# Patient Record
Sex: MALE | Race: WHITE
[De-identification: names, ages, dates, MRNs, and addresses within clinical notes are randomized per-mention and may not be internally consistent; named-entity substitution may affect disease eponyms.]

---

## 2019-03-12 ENCOUNTER — HOSPITAL ENCOUNTER (OUTPATIENT)
Dept: HOSPITAL 92 - SCSMRI | Age: 64
Discharge: HOME | End: 2019-03-12
Attending: NURSE PRACTITIONER
Payer: MEDICARE

## 2019-03-12 DIAGNOSIS — M51.37: ICD-10-CM

## 2019-03-12 DIAGNOSIS — M43.16: Primary | ICD-10-CM

## 2019-03-12 DIAGNOSIS — Z98.890: ICD-10-CM

## 2019-03-12 DIAGNOSIS — M51.9: ICD-10-CM

## 2019-03-12 DIAGNOSIS — M48.061: ICD-10-CM

## 2019-03-12 DIAGNOSIS — M51.36: ICD-10-CM

## 2019-03-12 DIAGNOSIS — M25.78: ICD-10-CM

## 2019-03-12 LAB — ESTIMATED GFR-MDRD - POC: (no result)

## 2019-03-12 PROCEDURE — A9577 INJ MULTIHANCE: HCPCS

## 2019-03-12 PROCEDURE — 82565 ASSAY OF CREATININE: CPT

## 2019-03-12 PROCEDURE — 72158 MRI LUMBAR SPINE W/O & W/DYE: CPT

## 2019-03-12 NOTE — MRI
MRI LUMBAR SPINE WITH AND WITHOUT CONTRAST:

 

INDICATIONS:

Back pain.  History of lumbar surgery in 2015 and 2018.

 

COMPARISON:

MRI lumbar spine from 09/06/2018.

 

TECHNIQUE:

Multiplanar, multisequence imaging of the lumbar spine obtained.

 

FINDINGS:

New postoperative changes are now noted at the L2-L3 level, when compared to the prior study.  Poster
ior laminectomy change is noted at this site with prominent enhancement in the postoperative bed.

 

The lumbar vertebrae maintain height.  Degenerative changes are again noted with anterior osteophytes
 prominent in the lower thoracic and upper lumbar vertebrae.  Slight wedging of L1 is stable.  Inferi
or endplate defect at L2 is again noted.  Degenerative disk changes throughout with loss of disk spac
e at L3-L4, L4-L5, and L5-S1 appear stable.  Slight anterolisthesis of L4-L5 again noted.

 

At L1-L2, minimal posterior listhesis.  Mild disk bulge.  Facet hypertrophy without significant centr
al canal or foraminal stenosis.

 

At L2-L3, new posterior laminectomy changes.  Slight anterolisthesis.  Mild bilateral foraminal narro
wing due to uncinate hypertrophy and diffuse disk bulge.  Prominent facet hypertrophy.  Mild central 
canal stenosis due to the facet hypertrophy.  Mild bilateral foraminal narrowing.  Postoperative whitten
ges, as noted above, with prominent enhancement in the posterior soft tissues.

 

At L3-L4, mild disk bulge.  Facet hypertrophy.  Mild to moderate central canal stenosis, similar to t
he prior exam.

 

At L4-L5, minimal anterolisthesis.  Broad-based disk bulge.  Facet hypertrophy.  Mild to moderate binta
tral canal stenosis.  Right foraminal stenosis secondary to disk bulge and facet hypertrophy, unchang
ed from prior exam.

 

At L5-S1, posterior laminectomy changes again noted.  No significant central canal stenosis.  Left fo
raminal encroachment secondary to disk osteophyte complex, unchanged from prior exam.

 

IMPRESSION:

New postoperative changes are now seen at L2-L3.  Prominent enhancement in the posterior soft tissues
 of the operative bed.  No evidence of abscess or fluid collection.  Findings at each level are descr
ibed above.

 

POS: [facility]

## 2019-04-08 ENCOUNTER — HOSPITAL ENCOUNTER (OUTPATIENT)
Dept: HOSPITAL 92 - TBSIIMAG | Age: 64
Discharge: HOME | End: 2019-04-08
Attending: NEUROLOGICAL SURGERY
Payer: MEDICARE

## 2019-04-08 DIAGNOSIS — M54.16: Primary | ICD-10-CM

## 2019-04-08 DIAGNOSIS — Z98.890: ICD-10-CM

## 2019-04-08 PROCEDURE — 72131 CT LUMBAR SPINE W/O DYE: CPT

## 2019-04-08 NOTE — CT
FCT Lumbar Spine WO Con



History: [Back pain. Numbness.]



Comparison: MRI March 12, 2019



Findings: The aortic contour is nonaneurysmal. No retroperitoneal adenopathy.



No hydronephrosis.



Laminectomy changes at L5 and L2. No acute fracture, malalignment, or subluxation.



Level by level analysis was done on the March 12, 2019 MRI examination which has greater sensitivity.
 Postoperative changes are similar in the midline soft tissues. No evidence for a large drainable flu
id collection. Paraspinal musculature is normal.



Impression: 

1. No acute fracture or malalignment. No new listhesis.

2. Level by level evaluation was performed at the MRI March 12, 2019. No new or worsening neural fora
patrice or spinal canal narrowing.

3. No drainable postoperative fluid collection.



Reported By: Truong Cervantes 

Electronically Signed:  4/8/2019 10:58 AM

## 2019-12-19 ENCOUNTER — HOSPITAL ENCOUNTER (EMERGENCY)
Dept: HOSPITAL 18 - NAV ERS | Age: 64
Discharge: HOME | End: 2019-12-19
Payer: MEDICARE

## 2019-12-19 DIAGNOSIS — Z79.899: ICD-10-CM

## 2019-12-19 DIAGNOSIS — S00.211A: ICD-10-CM

## 2019-12-19 DIAGNOSIS — B30.9: Primary | ICD-10-CM

## 2019-12-19 DIAGNOSIS — F32.9: ICD-10-CM

## 2019-12-19 DIAGNOSIS — Z79.891: ICD-10-CM

## 2019-12-19 DIAGNOSIS — A08.4: ICD-10-CM
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
## 2019-12-19 DIAGNOSIS — I10: ICD-10-CM

## 2019-12-19 PROCEDURE — 99283 EMERGENCY DEPT VISIT LOW MDM: CPT

## 2020-12-18 ENCOUNTER — HOSPITAL ENCOUNTER (EMERGENCY)
Dept: HOSPITAL 18 - NAV ERS | Age: 65
Discharge: ANOTHER HOSPITAL | End: 2020-12-18
Payer: MEDICARE

## 2020-12-18 DIAGNOSIS — M79.605: Primary | ICD-10-CM

## 2020-12-18 DIAGNOSIS — Z79.899: ICD-10-CM

## 2020-12-18 DIAGNOSIS — I10: ICD-10-CM

## 2020-12-18 PROCEDURE — 99284 EMERGENCY DEPT VISIT MOD MDM: CPT

## 2020-12-18 PROCEDURE — 85379 FIBRIN DEGRADATION QUANT: CPT

## 2022-06-01 ENCOUNTER — HOSPITAL ENCOUNTER (INPATIENT)
Dept: HOSPITAL 97 - ER | Age: 67
LOS: 4 days | Discharge: HOME HEALTH | DRG: 644 | End: 2022-06-05
Attending: HOSPITALIST | Admitting: HOSPITALIST
Payer: COMMERCIAL

## 2022-06-01 VITALS — BODY MASS INDEX: 25 KG/M2

## 2022-06-01 DIAGNOSIS — Z20.822: ICD-10-CM

## 2022-06-01 DIAGNOSIS — E83.42: ICD-10-CM

## 2022-06-01 DIAGNOSIS — E83.51: ICD-10-CM

## 2022-06-01 DIAGNOSIS — E66.9: ICD-10-CM

## 2022-06-01 DIAGNOSIS — G89.29: ICD-10-CM

## 2022-06-01 DIAGNOSIS — E87.2: ICD-10-CM

## 2022-06-01 DIAGNOSIS — E22.2: Primary | ICD-10-CM

## 2022-06-01 DIAGNOSIS — R63.1: ICD-10-CM

## 2022-06-01 DIAGNOSIS — D72.829: ICD-10-CM

## 2022-06-01 DIAGNOSIS — M54.9: ICD-10-CM

## 2022-06-01 DIAGNOSIS — E87.6: ICD-10-CM

## 2022-06-01 DIAGNOSIS — I10: ICD-10-CM

## 2022-06-01 DIAGNOSIS — E83.39: ICD-10-CM

## 2022-06-01 DIAGNOSIS — M62.838: ICD-10-CM

## 2022-06-01 LAB
BUN BLD-MCNC: 10 MG/DL (ref 7–18)
BUN BLD-MCNC: 11 MG/DL (ref 7–18)
BUN BLD-MCNC: 7 MG/DL (ref 7–18)
GLUCOSE SERPLBLD-MCNC: 107 MG/DL (ref 74–106)
GLUCOSE SERPLBLD-MCNC: 134 MG/DL (ref 74–106)
GLUCOSE SERPLBLD-MCNC: 168 MG/DL (ref 74–106)
HCT VFR BLD CALC: 38.9 % (ref 39.6–49)
LYMPHOCYTES # SPEC AUTO: 1 K/UL (ref 0.7–4.9)
MAGNESIUM SERPL-MCNC: 1.7 MG/DL (ref 1.8–2.4)
PMV BLD: 8.2 FL (ref 7.6–11.3)
POTASSIUM SERPL-SCNC: 3.5 MMOL/L (ref 3.5–5.1)
POTASSIUM SERPL-SCNC: 3.5 MMOL/L (ref 3.5–5.1)
POTASSIUM SERPL-SCNC: 3.6 MMOL/L (ref 3.5–5.1)
RBC # BLD: 4.47 M/UL (ref 4.33–5.43)

## 2022-06-01 PROCEDURE — 96375 TX/PRO/DX INJ NEW DRUG ADDON: CPT

## 2022-06-01 PROCEDURE — 83880 ASSAY OF NATRIURETIC PEPTIDE: CPT

## 2022-06-01 PROCEDURE — 82553 CREATINE MB FRACTION: CPT

## 2022-06-01 PROCEDURE — 80069 RENAL FUNCTION PANEL: CPT

## 2022-06-01 PROCEDURE — 87040 BLOOD CULTURE FOR BACTERIA: CPT

## 2022-06-01 PROCEDURE — 84439 ASSAY OF FREE THYROXINE: CPT

## 2022-06-01 PROCEDURE — 84132 ASSAY OF SERUM POTASSIUM: CPT

## 2022-06-01 PROCEDURE — 84443 ASSAY THYROID STIM HORMONE: CPT

## 2022-06-01 PROCEDURE — 85025 COMPLETE CBC W/AUTO DIFF WBC: CPT

## 2022-06-01 PROCEDURE — 82306 VITAMIN D 25 HYDROXY: CPT

## 2022-06-01 PROCEDURE — 96374 THER/PROPH/DIAG INJ IV PUSH: CPT

## 2022-06-01 PROCEDURE — 84300 ASSAY OF URINE SODIUM: CPT

## 2022-06-01 PROCEDURE — 80048 BASIC METABOLIC PNL TOTAL CA: CPT

## 2022-06-01 PROCEDURE — 83970 ASSAY OF PARATHORMONE: CPT

## 2022-06-01 PROCEDURE — 84550 ASSAY OF BLOOD/URIC ACID: CPT

## 2022-06-01 PROCEDURE — 84100 ASSAY OF PHOSPHORUS: CPT

## 2022-06-01 PROCEDURE — 83735 ASSAY OF MAGNESIUM: CPT

## 2022-06-01 PROCEDURE — 82570 ASSAY OF URINE CREATININE: CPT

## 2022-06-01 PROCEDURE — 83935 ASSAY OF URINE OSMOLALITY: CPT

## 2022-06-01 PROCEDURE — 82550 ASSAY OF CK (CPK): CPT

## 2022-06-01 PROCEDURE — 81015 MICROSCOPIC EXAM OF URINE: CPT

## 2022-06-01 PROCEDURE — 97161 PT EVAL LOW COMPLEX 20 MIN: CPT

## 2022-06-01 PROCEDURE — 96361 HYDRATE IV INFUSION ADD-ON: CPT

## 2022-06-01 PROCEDURE — 82652 VIT D 1 25-DIHYDROXY: CPT

## 2022-06-01 PROCEDURE — 97116 GAIT TRAINING THERAPY: CPT

## 2022-06-01 PROCEDURE — 81003 URINALYSIS AUTO W/O SCOPE: CPT

## 2022-06-01 PROCEDURE — 36415 COLL VENOUS BLD VENIPUNCTURE: CPT

## 2022-06-01 PROCEDURE — 99285 EMERGENCY DEPT VISIT HI MDM: CPT

## 2022-06-01 RX ADMIN — ANTACID TABLETS PRN MG: 500 TABLET, CHEWABLE ORAL at 18:48

## 2022-06-01 RX ADMIN — HYDROCODONE BITARTRATE AND ACETAMINOPHEN PRN TAB: 5; 325 TABLET ORAL at 20:46

## 2022-06-01 RX ADMIN — SODIUM CHLORIDE SCH: 0.9 INJECTION, SOLUTION INTRAVENOUS at 16:35

## 2022-06-01 RX ADMIN — Medication SCH ML: at 20:45

## 2022-06-01 RX ADMIN — ENOXAPARIN SODIUM SCH MG: 40 INJECTION SUBCUTANEOUS at 17:37

## 2022-06-01 RX ADMIN — ANTACID TABLETS PRN MG: 500 TABLET, CHEWABLE ORAL at 20:42

## 2022-06-01 RX ADMIN — CYCLOBENZAPRINE HYDROCHLORIDE PRN MG: 10 TABLET, FILM COATED ORAL at 20:45

## 2022-06-01 NOTE — ER
Nurse's Notes                                                                                     

 [facility]                                                                 

Name: Yan Law                                                                             

Age: 67 yrs                                                                                       

Sex: Male                                                                                         

: 1955                                                                                   

MRN: [id number]                                                                                   

Arrival Date: 2022                                                                          

Time: 06:49                                                                                       

Account#: [id number]                                                                            

Bed 19                                                                                            

Private MD:                                                                                       

Diagnosis: Altered mental status, unspecified;Hypo-osmolality and hyponatremia;Opioid use,        

  unspecified with withdrawal                                                                     

                                                                                                  

Presentation:                                                                                     

                                                                                             

06:49 Chief complaint: EMS states: Report patient c/o chronic back pain ,body cramping, and   ag7 

      he is out of medication. Coronavirus screen: Client denies travel out of the [place] in        

      the last 14 days. At this time, the client does not indicate any symptoms associated        

      with coronavirus-19. Ebola Screen: Patient negative for fever greater than or equal to      

      101.5 degrees Fahrenheit, and additional compatible Ebola Virus Disease symptoms            

      Patient denies exposure to infectious person. Patient denies travel to an                   

      Ebola-affected area in the 21 days before illness onset. Initial Sepsis Screen: Does        

      the patient meet any 2 criteria? No. Patient's initial sepsis screen is negative. Does      

      the patient have a suspected source of infection? No. Patient's initial sepsis screen       

      is negative. Risk Assessment: Do you want to hurt yourself or someone else? Patient         

      reports no desire to harm self or others. Onset of symptoms was 2022.               

06:49 Method Of Arrival: EMS: [facility]                                                       7 

06:49 Acuity: EVERARDO 3                                                                           ag7 

                                                                                                  

Triage Assessment:                                                                                

06:55 General: Appears in no apparent distress. Behavior is calm, cooperative, appropriate    ag7 

      for age. Pain: Complains of pain in back Pain does not radiate. Pain currently is 10        

      out of 10 on a pain scale. Quality of pain is described as aching, sharp, Pain began        

      suddenly, Is chronic. Neuro: No deficits noted. Cardiovascular: No deficits noted.          

      Respiratory: No deficits noted.                                                             

                                                                                                  

Historical:                                                                                       

- Allergies:                                                                                      

06:52 No Known Allergies;                                                                     ag7 

- Home Meds:                                                                                      

06:52 Atenolol Oral [Active]; gabapentin oral [Active]; Hydrocodone-Acetaminophen Oral        ag7 

      [Active];                                                                                   

- PMHx:                                                                                           

06:52 Chronic back pain; Hypertensive disorder; Neuropathy;                                   ag7 

- PSHx:                                                                                           

06:52 BACK SURGERY;                                                                           ag7 

                                                                                                  

- Immunization history:: Adult Immunizations up to date, Client reports having NOT                

  received the Covid vaccine. Flu vaccine is not up to date. Patient has never been               

  vaccinated.                                                                                     

- Social history:: Smoking status: Patient denies any tobacco usage or history of.                

  Patient/guardian denies using alcohol, street drugs.                                            

                                                                                                  

                                                                                                  

Screenin:55 Abuse screen: Denies threats or abuse. Nutritional screening: No deficits noted.        ag7 

      Tuberculosis screening: No symptoms or risk factors identified. Fall Risk No fall in        

      past 12 months (0 pts). No secondary diagnosis (0 pts). No IV (0 pts). Ambulatory Aid-      

      None/Bed Rest/Nurse Assist (0 pts). Gait- Normal/Bed Rest/Wheelchair (0 pts) Mental         

      Status- Oriented to own ability (0 pts). Total Sommers Fall Scale indicates No Risk (0-24     

      pts).                                                                                       

                                                                                                  

Assessment:                                                                                       

07:03 Reassessment: pt c/o nauseousness when call light answered. emesis basin provided.      tw2 

      provider notified.                                                                          

07:39 Reassessment: provider at bedside at this time.                                         tw2 

09:02 Reassessment: Patient and/or family updated on plan of care and expected duration. Pain tw2 

      level reassessed. Patient is alert, oriented x 3, equal unlabored respirations, skin        

      warm/dry/pink. pt is still moaning audibly at this time. medicated as per ordered. will     

      continue to monitor. pt states "i just want to sleep, i havent slept in 4 days".            

11:22 Reassessment: Patient appears in no apparent distress at this time. Patient and/or      tw2 

      family updated on plan of care and expected duration. Pain level reassessed.                

                                                                                                  

Vital Signs:                                                                                      

06:49  / 69; Pulse 65; Resp 20 S; Temp 98.2(O); Pulse Ox 99% on R/A; Weight 113.4 kg    ag7 

      (R); Height 5 ft. 11 in. (180.34 cm) (R); Pain 10/10;                                       

09:02  / 71; Pulse 71; Resp 19; Pulse Ox 99% on R/A;                                    tw2 

10:00  / 108; Pulse 69; Resp 17; Pulse Ox 97% on R/A;                                   tw2 

11:21  / 110; Pulse 64; Resp 17; Pulse Ox 95% on R/A;                                   tw2 

12:00  / 50; Pulse 82; Resp 17; Pulse Ox 98% on R/A;                                    tw2 

13:00 BP 90 / 73 Supine; Pulse 82; Resp 17; Pulse Ox 99% on R/A;                              tw2 

14:10  / 73; Pulse 70; Resp 17; Pulse Ox 97% on R/A;                                    tw2 

06:49 Body Mass Index 34.87 (113.40 kg, 180.34 cm)                                            ag7 

                                                                                                  

ED Course:                                                                                        

06:49 Patient arrived in ED.                                                                  ag7 

06:52 Triage completed.                                                                       ag7 

07:03 Bed in low position. Call light in reach. Cardiac monitor on. Pulse ox on.              tw2 

07:10 Cori Garza RN is Primary Nurse.                                                        tw2 

07:31 Monico Barrientos MD is Attending Physician.                                              kdr 

08:47 Inserted saline lock: 20 gauge in right antecubital area, using aseptic technique.      tw2 

      Blood collected.                                                                            

10:14 Hair Carpenter is Hospitalizing Provider.                                               kdr 

14:12 Arm band placed on.                                                                     tw2 

14:26 Awaiting lab results, Awaiting: prior to room assignment for admission.                 tw2 

15:49 No provider procedures requiring assistance completed. Patient admitted, IV remains in  tw2 

      place.                                                                                      

                                                                                                  

Administered Medications:                                                                         

08:45 Drug: NS 0.9% 1000 ml Route: IV; Rate: 1 bolus; Site: right antecubital;                tw2 

11:00 Follow up: Response: No adverse reaction; IV Status: Completed infusion; IV Intake:     tw2 

      1000ml                                                                                      

08:47 Drug: Zofran (Ondansetron) 4 mg Route: IVP; Site: right antecubital;                    tw2 

14:12 Follow up: Response: No adverse reaction; Nausea is decreased                           tw2 

08:57 Drug: Ketorolac 15 mg Route: IVP; Site: right antecubital;                              tw2 

09:30 Follow up: Response: No adverse reaction                                                tw2 

10:03 Not Given (md garcia): NS 0.45 % with KCl 20 mEq/L 1000 ml IV at 125 ml/hr once         ss  

10:11 Drug: NS 0.9% 1000 ml Route: IV; Rate: 125 ml/hr; Site: right antecubital;              tw2 

16:22 Follow up: IV Status: Infusion continued upon admission                                 tw2 

                                                                                                  

                                                                                                  

Intake:                                                                                           

11:00 IV: 1000ml; Total: 1000ml.                                                              tw2 

                                                                                                  

Outcome:                                                                                          

10:16 Decision to Hospitalize by Provider.                                                    kdr 

15:49 Admitted to Med/surg accompanied by tech, via wheelchair, room 405, with chart, Report  tw2 

      called to  ROSY Lau                                                                          

16:22 Condition: stable                                                                       tw2 

16:22 Patient left the ED.                                                                    tw2 

                                                                                                  

Signatures:                                                                                       

Monico Barrientos MD MD   kdr                                                  

Cori Garza RN                          RN   tw2                                                  

Marina Ulloa RN                       RN   ag7                                                  

Serenity Castellano RN   ss                                                   

                                                                                                  

Corrections: (The following items were deleted from the chart)                                    

10:42 09:02 Pulse 71bpm; Resp 19bpm; Pulse Ox 99% RA; tw2                                     tw2 

11:22 09:02  / 108; Pulse 69bpm; Resp 17bpm; Pulse Ox 97% RA; tw2                       tw2 

                                                                                                  

**************************************************************************************************

## 2022-06-01 NOTE — P.HP
Certification for Inpatient


Patient admitted to: Inpatient


With expected LOS: >2 Midnights


Practitioner: I am a practitioner with admitting privileges, knowledge of 

patient current condition, hospital course, and medical plan of care.


Services: Services provided to patient in accordance with Admission requirements

found in Title 42 Section 412.3 of the Code of Federal Regulations





Patient History


Date of Service: 06/01/22


Reason for admission: Backpain


History of Present Illness: 


67-year-old gentleman with a history of chronic back pain and hypertension 

presented to the emergency department with a complaint of back pain which has 

gotten progressively worse over the past 1 week.  He is on a chronic hydrocodone

therapy for the back pain, hydrocodone was started about 1 month ago.  Before 

that his pain was being managed with tramadol.  Patient also complaining of 

spasms in his arms and legs and difficulty moving.  He denied any fever or 

chills.  He denied any nausea vomiting or diarrhea.  He denied any cough.  Blood

work done in the emergency department demonstrated hyponatremia and 

leukocytosis.  UA not done yet, COVID-19 test is pending.  Patient with 

uncontrolled pain.  He reports he ran out of his pain medication about 1 week 

ago.  He just established care with a pain management physician in [place].

Patient is hospitalized for further management.








- Past Medical/Surgical History


-: Chronic back pain


-: Hypertension


-: Obesity


-: Back surgery





- Family History


  ** Father


-: Cancer





- Social History


Smoking Status: Never smoker


Alcohol use: No


CD- Drugs: No


Place of Residence: Home





Review of Systems


Other: 


Except as documented, all other systems reviewed and negative.








Physical Examination





- Physical Exam


General: Alert, Oriented x3, Mild distress (Due to pain)


HEENT: Atraumatic, Normocephalic, PERRLA, Mucous membr. moist/pink, EOMI, 

Sclerae nonicteric


Neck: Supple, JVD not distended


Respiratory: Clear to auscultation bilaterally, Normal air movement


Cardiovascular: Regular rate/rhythm, Normal S1 S2, No murmurs, Edema (1+ 

bilateral lower extremity edema)


Capillary refill: <2 Seconds


Gastrointestinal: Normal bowel sounds, Soft and benign, Non-distended, No 

tenderness


Musculoskeletal: No swelling, No tenderness


Integumentary: No rashes, No cyanosis


Neurological: Normal speech, Normal strength at 5/5 x4 extr, Cranial nerves 3-12

intact


Lymphatics: No axilla or inguinal lymphadenopathy





- Studies


Laboratory Data (last 24 hrs)





06/01/22 08:47: Sodium 123 L, Potassium 3.6, BUN 11, Creatinine 0.69, Glucose 

168 H


06/01/22 08:47: WBC 16.9 H, Hgb 13.9, Hct 38.9 L, Plt Count 230








Assessment and Plan





- Problems (Diagnosis)


(1) Hyponatremia


Current Visit: Yes   Status: Acute   





(2) Leukocytosis


Current Visit: Yes   Status: Acute   





(3) Chronic back pain


Current Visit: Yes   Status: Acute   





(4) Hypertension


Current Visit: Yes   Status: Acute   





- Plan


Admit patient to the medical floor.


The etiology of hyponatremia unknown.


Hyponatremia may be contributing to patient's muscle spasms.


Start aggressive IV normal saline hydration.


Monitor BMP every 6 hours to follow sodium level.


Nephrology consulted to assist with management.


Obtain UA and blood cultures given leukocytosis.  Antibiotics pending results.


Supportive measures with muscle relaxant and Norco.


Monitor CBC to follow leukocytosis.








- Advance Directives


Does patient have a Living Will: No


Does patient have a Durable POA for Healthcare: No

## 2022-06-01 NOTE — EDPHYS
Physician Documentation                                                                           

 [facility]                                                                 

Name: Yan Law                                                                             

Age: 67 yrs                                                                                       

Sex: Male                                                                                         

: 1955                                                                                   

MRN: [id number]                                                                                   

Arrival Date: 2022                                                                          

Time: 06:49                                                                                       

Account#: [id number]                                                                            

Bed 19                                                                                            

Private MD:                                                                                       

ED Physician Monico Barrientos                                                                       

HPI:                                                                                              

                                                                                             

13:35 This 67 yrs old Male presents to ER via EMS with complaints of Back Pain.               kdr 

13:35 Patient presents to the ED complaining of medication shortage for the last 4 days. He   kdr 

      states he has been on Norco 3 times daily for some years. He has since located locally      

      here and has not been able to get a refill of his narcotics. Patient appears slow in        

      mentation and somewhat altered. He also states he has been unable to eat or drink in        

      the last few days. He has not had any recent physician visits. He states that he has an     

      appointment to see Dr. Harris on Monday.. Onset: The symptoms/episode began/occurred      

      gradually, 4 day(s) ago. Severity of symptoms: At their worst the symptoms were mild in     

      the emergency department the symptoms are unchanged. The patient has not experienced        

      similar symptoms in the past. The patient has not recently seen a physician.                

                                                                                                  

Historical:                                                                                       

- Allergies:                                                                                      

06:52 No Known Allergies;                                                                     ag7 

- Home Meds:                                                                                      

06:52 Atenolol Oral [Active]; gabapentin oral [Active]; Hydrocodone-Acetaminophen Oral        ag7 

      [Active];                                                                                   

- PMHx:                                                                                           

06:52 Chronic back pain; Hypertensive disorder; Neuropathy;                                   ag7 

- PSHx:                                                                                           

06:52 BACK SURGERY;                                                                           ag7 

                                                                                                  

- Immunization history:: Adult Immunizations up to date, Client reports having NOT                

  received the Covid vaccine. Flu vaccine is not up to date. Patient has never been               

  vaccinated.                                                                                     

- Social history:: Smoking status: Patient denies any tobacco usage or history of.                

  Patient/guardian denies using alcohol, street drugs.                                            

                                                                                                  

                                                                                                  

ROS:                                                                                              

13:35 Constitutional: Negative for fever, chills, and weight loss, Eyes: Negative for injury, kdr 

      pain, redness, and discharge, ENT: Negative for injury, pain, and discharge, Neck:          

      Negative for injury, pain, and swelling, Cardiovascular: Negative for chest pain,           

      palpitations, and edema, Respiratory: Negative for shortness of breath, cough,              

      wheezing, and pleuritic chest pain, Abdomen/GI: Negative for abdominal pain, nausea,        

      vomiting, diarrhea, and constipation, : Negative for injury, bleeding, discharge, and     

      swelling, MS/Extremity: Negative for injury and deformity, Skin: Negative for injury,       

      rash, and discoloration, Neuro: Negative for headache, weakness, numbness, tingling,        

      and seizure activity. Psych: Negative for depression, anxiety, suicide ideation,            

      homicidal ideation, and hallucinations, Allergy/Immunology: Negative for hives, rash,       

      and allergies, Endocrine: Negative for neck swelling, polydipsia, polyuria, polyphagia,     

      and marked weight changes, Hematologic/Lymphatic: Negative for swollen nodes, abnormal      

      bleeding, and unusual bruising.                                                             

13:35 Back: Positive for pain at rest, pain with movement, Negative for injury or acute           

      deformity, decreased range of motion.                                                       

                                                                                                  

Exam:                                                                                             

13:35 Constitutional:  This is a well developed, well nourished patient who is awake, alert,  kdr 

      and in no acute distress. Head/Face:  Normocephalic, atraumatic. Eyes:  Pupils equal        

      round and reactive to light, extra-ocular motions intact.  Lids and lashes normal.          

      Conjunctiva and sclera are non-icteric and not injected.  Cornea within normal limits.      

      Periorbital areas with no swelling, redness, or edema. Neck:  Trachea midline, no           

      thyromegaly or masses palpated, and no cervical lymphadenopathy.  Supple, full range of     

      motion without nuchal rigidity, or vertebral point tenderness.  No Meningismus.             

      Chest/axilla:  Normal chest wall appearance and motion.  Nontender with no deformity.       

      No lesions are appreciated. Cardiovascular:  Regular rate and rhythm with a normal S1       

      and S2.  No gallops, murmurs, or rubs.  Normal PMI, no JVD.  No pulse deficits.             

      Respiratory:  Lungs have equal breath sounds bilaterally, clear to auscultation and         

      percussion.  No rales, rhonchi or wheezes noted.  No increased work of breathing, no        

      retractions or nasal flaring. Abdomen/GI:  Soft, non-tender, with normal bowel sounds.      

      No distension or tympany.  No guarding or rebound.  No evidence of tenderness               

      throughout. Back:  No spinal tenderness.  No costovertebral tenderness.  Full range of      

      motion. Skin:  Warm, dry with normal turgor.  Normal color with no rashes, no lesions,      

      and no evidence of cellulitis.                                                              

13:35 ENT: Mouth: Oral mucosa: dry.                                                               

                                                                                                  

Vital Signs:                                                                                      

06:49  / 69; Pulse 65; Resp 20 S; Temp 98.2(O); Pulse Ox 99% on R/A; Weight 113.4 kg    ag7 

      (R); Height 5 ft. 11 in. (180.34 cm) (R); Pain 10/10;                                       

09:02  / 71; Pulse 71; Resp 19; Pulse Ox 99% on R/A;                                    tw2 

10:00  / 108; Pulse 69; Resp 17; Pulse Ox 97% on R/A;                                   tw2 

11:21  / 110; Pulse 64; Resp 17; Pulse Ox 95% on R/A;                                   tw2 

12:00  / 50; Pulse 82; Resp 17; Pulse Ox 98% on R/A;                                    tw2 

13:00 BP 90 / 73 Supine; Pulse 82; Resp 17; Pulse Ox 99% on R/A;                              tw2 

14:10  / 73; Pulse 70; Resp 17; Pulse Ox 97% on R/A;                                    tw2 

06:49 Body Mass Index 34.87 (113.40 kg, 180.34 cm)                                            ag7 

                                                                                                  

MDM:                                                                                              

10:16 Patient medically screened.                                                             kdr 

13:35 Data reviewed: vital signs, nurses notes, lab test result(s), radiologic studies.       kdr 

      Counseling: I had a detailed discussion with the patient and/or guardian regarding: the     

      historical points, exam findings, and any diagnostic results supporting the                 

      discharge/admit diagnosis, the presence of at least one elevated blood pressure reading     

      (>120/80) during this emergency department visit, lab results, radiology results, the       

      need for further work-up and treatment in the hospital.                                     

                                                                                                  

                                                                                             

07:47 Order name: CBC with Diff; Complete Time: 09:32                                         kdr 

                                                                                             

07:47 Order name: Chem 7; Complete Time: 10:03                                                kdr 

                                                                                             

11:32 Order name: COVID-19 SARS RT PCR (Document "Date of Onset" if Symptomatic)              bd  

                                                                                             

13:30 Order name: Urinalysis W/Microscopic                                                    EDMS

                                                                                             

13:30 Order name: Blood Culture                                                               EDMS

                                                                                             

08:51 Order name: IV Start; Complete Time: 08:51                                              tw2 

                                                                                                  

Administered Medications:                                                                         

08:45 Drug: NS 0.9% 1000 ml Route: IV; Rate: 1 bolus; Site: right antecubital;                tw2 

11:00 Follow up: Response: No adverse reaction; IV Status: Completed infusion; IV Intake:     tw2 

      1000ml                                                                                      

08:47 Drug: Zofran (Ondansetron) 4 mg Route: IVP; Site: right antecubital;                    tw2 

14:12 Follow up: Response: No adverse reaction; Nausea is decreased                           tw2 

08:57 Drug: Ketorolac 15 mg Route: IVP; Site: right antecubital;                              tw2 

09:30 Follow up: Response: No adverse reaction                                                tw2 

10:03 Not Given (md garcia): NS 0.45 % with KCl 20 mEq/L 1000 ml IV at 125 ml/hr once         ss  

10:11 Drug: NS 0.9% 1000 ml Route: IV; Rate: 125 ml/hr; Site: right antecubital;              tw2 

16:22 Follow up: IV Status: Infusion continued upon admission                                 tw2 

                                                                                                  

                                                                                                  

Disposition Summary:                                                                              

22 10:16                                                                                    

Hospitalization Ordered                                                                           

      Hospitalization Status: Inpatient Admission                                             kdr 

      Provider: Hair Carpenter                                                                kdr 

      Location: [facility] ([facility]patient)                                                 kdr 

      Condition: Fair                                                                         kdr 

      Problem: new                                                                            kdr 

      Symptoms: have improved                                                                 kdr 

      Bed/Room Type: Standard                                                                 kdr 

      Room Assignment: 405(22 15:41)                                                    dw  

      Diagnosis                                                                                   

        - Altered mental status, unspecified                                                  kdr 

        - Hypo-osmolality and hyponatremia                                                    kdr 

        - Opioid use, unspecified with withdrawal                                             kdr 

      Forms:                                                                                      

        - Medication Reconciliation Form                                                      kdr 

        - SBAR form                                                                           kdr 

Signatures:                                                                                       

Dispatcher [employer]                           Corazon Shukla RN                        RN   dw                                                   

Monico Barrientos MD MD   kdr                                                  

Cori Garza RN                          RN   tw2                                                  

Marina Ulloa RN                       RN   ag7                                                  

Serenity Castellano RN   ss                                                   

                                                                                                  

Corrections: (The following items were deleted from the chart)                                    

15:41 10:16 kdr                                                                               dw  

                                                                                                  

**************************************************************************************************

## 2022-06-02 LAB
BUN BLD-MCNC: 6 MG/DL (ref 7–18)
BUN BLD-MCNC: 6 MG/DL (ref 7–18)
BUN BLD-MCNC: 8 MG/DL (ref 7–18)
BUN BLD-MCNC: 8 MG/DL (ref 7–18)
CKMB CREATINE KINASE MB: 10.2 NG/ML (ref 1–3.6)
GLUCOSE SERPLBLD-MCNC: 112 MG/DL (ref 74–106)
GLUCOSE SERPLBLD-MCNC: 114 MG/DL (ref 74–106)
GLUCOSE SERPLBLD-MCNC: 121 MG/DL (ref 74–106)
GLUCOSE SERPLBLD-MCNC: 154 MG/DL (ref 74–106)
HCT VFR BLD CALC: 33.4 % (ref 39.6–49)
LYMPHOCYTES # SPEC AUTO: 1.1 K/UL (ref 0.7–4.9)
MAGNESIUM SERPL-MCNC: 1.5 MG/DL (ref 1.8–2.4)
PMV BLD: 8.3 FL (ref 7.6–11.3)
POTASSIUM SERPL-SCNC: 2.9 MMOL/L (ref 3.5–5.1)
POTASSIUM SERPL-SCNC: 3.2 MMOL/L (ref 3.5–5.1)
POTASSIUM SERPL-SCNC: 3.3 MMOL/L (ref 3.5–5.1)
POTASSIUM SERPL-SCNC: 3.4 MMOL/L (ref 3.5–5.1)
POTASSIUM UR-SCNC: 22 MMOL/L (ref 20–40)
RBC # BLD: 3.95 M/UL (ref 4.33–5.43)
SODIUM UR-SCNC: 36 MMOL/L (ref 27–287)
TSH SERPL DL<=0.05 MIU/L-ACNC: 0.3 UIU/ML (ref 0.36–3.74)
UA DIPSTICK W REFLEX MICRO PNL UR: (no result)

## 2022-06-02 RX ADMIN — HYDROCODONE BITARTRATE AND ACETAMINOPHEN PRN TAB: 5; 325 TABLET ORAL at 20:27

## 2022-06-02 RX ADMIN — MORPHINE SULFATE PRN MG: 2 INJECTION, SOLUTION INTRAMUSCULAR; INTRAVENOUS at 01:51

## 2022-06-02 RX ADMIN — ANTACID TABLETS PRN MG: 500 TABLET, CHEWABLE ORAL at 10:32

## 2022-06-02 RX ADMIN — MORPHINE SULFATE PRN MG: 2 INJECTION, SOLUTION INTRAMUSCULAR; INTRAVENOUS at 15:29

## 2022-06-02 RX ADMIN — SODIUM CHLORIDE SCH MLS: 0.9 INJECTION, SOLUTION INTRAVENOUS at 17:26

## 2022-06-02 RX ADMIN — ANTACID TABLETS PRN MG: 500 TABLET, CHEWABLE ORAL at 22:26

## 2022-06-02 RX ADMIN — ENOXAPARIN SODIUM SCH MG: 40 INJECTION SUBCUTANEOUS at 08:28

## 2022-06-02 RX ADMIN — SODIUM CHLORIDE SCH MLS: 0.9 INJECTION, SOLUTION INTRAVENOUS at 08:28

## 2022-06-02 RX ADMIN — Medication SCH ML: at 22:13

## 2022-06-02 RX ADMIN — ANTACID TABLETS PRN MG: 500 TABLET, CHEWABLE ORAL at 15:27

## 2022-06-02 RX ADMIN — HYDROCODONE BITARTRATE AND ACETAMINOPHEN PRN TAB: 5; 325 TABLET ORAL at 05:02

## 2022-06-02 RX ADMIN — CYCLOBENZAPRINE HYDROCHLORIDE PRN MG: 10 TABLET, FILM COATED ORAL at 20:26

## 2022-06-02 RX ADMIN — ANTACID TABLETS PRN MG: 500 TABLET, CHEWABLE ORAL at 18:04

## 2022-06-02 RX ADMIN — MORPHINE SULFATE PRN MG: 2 INJECTION, SOLUTION INTRAMUSCULAR; INTRAVENOUS at 08:29

## 2022-06-02 RX ADMIN — SODIUM CHLORIDE SCH MLS: 0.9 INJECTION, SOLUTION INTRAVENOUS at 00:44

## 2022-06-02 RX ADMIN — MORPHINE SULFATE PRN MG: 2 INJECTION, SOLUTION INTRAMUSCULAR; INTRAVENOUS at 22:15

## 2022-06-02 RX ADMIN — Medication SCH: at 08:29

## 2022-06-02 NOTE — P.PN
Subjective


Date of Service: 06/02/22


Chief Complaint: Backpain


Patient reports feeling better today.


His muscle spasms have resolved.


He is ambulatory.  He stated his pain is better.


Sodium level has not improved.








Physical Examination





- Vital Signs


Temperature: 98.2 F


Blood Pressure: 148/63


Pulse: 70


Respirations: 18


Pulse Ox (%): 98





- Physical Exam


General: Alert, In no apparent distress, Oriented x3


HEENT: PERRLA, Mucous membr. moist/pink, EOMI, Sclerae nonicteric


Neck: Supple, JVD not distended


Respiratory: Clear to auscultation bilaterally, Normal air movement


Cardiovascular: Regular rate/rhythm, Normal S1 S2, No murmurs, Edema (Bilateral 

lower extremities.)


Capillary refill: <2 Seconds


Gastrointestinal: Normal bowel sounds, Soft and benign, Non-distended, No 

tenderness


Musculoskeletal: No tenderness


Integumentary: No rashes, No erythema, No cyanosis


Neurological: Normal speech, Normal strength at 5/5 x4 extr, Cranial nerves 3-12

intact





Assessment And Plan





- Current Problems (Diagnosis)


(1) Hyponatremia


Current Visit: Yes   Status: Acute   





(2) Leukocytosis


Current Visit: Yes   Status: Acute   





(3) Chronic back pain


Current Visit: Yes   Status: Acute   





(4) Hypertension


Current Visit: Yes   Status: Acute   





- Plan


The etiology of hyponatremia unknown.  I suspect SIADH since sodium level has 

not improved with IV normal saline.


Nephrology input appreciated.


Replete potassium, keep potassium level around 4 given that he experienced 

muscle spasms.


Continue IV normal saline hydration.


Monitor BMP every 6 hours to follow sodium level.


We will also restrict free water.


Nephrology is following and assisting with management.


UA is still uncollected. Blood cultures. Antibiotics pending UA result.


Supportive measures with muscle relaxant and Norco.


Monitor CBC to follow leukocytosis.

## 2022-06-02 NOTE — P.CNS
Date of Consult: 06/02/22


Reason for Consult: Hyponatremia


Requesting Physician: dejon carter


Chief Complaint: Backpain


History of Present Illness: 





67M w/ PMHx of Htn & chronic back pain who p/w acute worsening of his back pain,

found to have hyponatremia.  Serum Na 123 on adm, most recently at 127.  He 

received IV fluids.





Allergies





No Known Allergies Allergy (Unverified 06/01/22 13:40)


   





Home Medications: 








Citalopram [Celexa] 40 mg PO DAILY 06/01/22 


Gabapentin 300 mg PO TID 06/01/22 


Hydrocodone Bit/Acetaminophen [Norco  Tablet] 1 each PO TID PRN 06/01/22 


Tramadol HCl [Ultram] 50 mg PO TID PRN 06/01/22 








- Past Medical/Surgical History


-: Chronic back pain


-: Hypertension


-: Obesity


-: Back surgery





- Family History


  ** Father


Medical History: Cancer





- Social History


Alcohol use: No


CD- Drugs: No


Place of Residence: Home





Review of Systems


General: Weakness


Eyes: Unremarkable


ENT: Unremarkable


Respiratory: Unremarkable


Cardiovascular: Unremarkable


Gastrointestinal: Unremarkable


Genitourinary: Unremarkable


Musculoskeletal: Back Pain


Integumentary: Unremarkable


Neurological: Unremarkable


Lymphatics: Unremarkable





Physical Examination














Temp Pulse Resp BP Pulse Ox


 


 98.2 F   70   18   148/63 H  98 


 


 06/02/22 08:00  06/02/22 08:00  06/02/22 08:00  06/02/22 08:00  06/02/22 08:00








General: In no apparent distress


HEENT: Atraumatic, Normocephalic


Neck: Supple, JVD not distended


Respiratory: Clear to auscultation bilaterally


Cardiovascular: No rubs, No murmurs


Gastrointestinal: Soft and benign, No rebound


Musculoskeletal: No clubbing, No swelling


Integumentary: No warmth


Neurological: Normal speech, Normal tone


Lymphatics: No axilla or inguinal lymphadenopathy


Urinary: Other (No bladder distention)


External genitalia: Deferred


Rectal: Deferred


Conclusions/Impression: 





# Hyponatremia 2/2 high ADH state from back pain + nausea +/- low solute intake


Serum Na 123 on adm, improved to 127


Urine chem c/w high ADH state


BNP elevated.  Cont NS gtt for now.


He is a vegetarian.  Advised on adeq po solid food intake TID.





# Hypokalemia


KCl repletion ongoing





# HypoMg


Mg repletion ongoing





# Chronic back pain


Per primary team





# Htn


BP above goal


Start Amlodipine 5 mg po daily


Pain control

## 2022-06-03 LAB
BUN BLD-MCNC: 5 MG/DL (ref 7–18)
BUN BLD-MCNC: 5 MG/DL (ref 7–18)
GLUCOSE SERPLBLD-MCNC: 116 MG/DL (ref 74–106)
GLUCOSE SERPLBLD-MCNC: 154 MG/DL (ref 74–106)
MAGNESIUM SERPL-MCNC: 1.9 MG/DL (ref 1.8–2.4)
POTASSIUM SERPL-SCNC: 3.6 MMOL/L (ref 3.5–5.1)
POTASSIUM SERPL-SCNC: 3.6 MMOL/L (ref 3.5–5.1)

## 2022-06-03 RX ADMIN — POTASSIUM & SODIUM PHOSPHATES POWDER PACK 280-160-250 MG SCH PKT: 280-160-250 PACK at 09:15

## 2022-06-03 RX ADMIN — TRAMADOL HYDROCHLORIDE PRN MG: 50 TABLET, COATED ORAL at 15:53

## 2022-06-03 RX ADMIN — Medication SCH: at 09:00

## 2022-06-03 RX ADMIN — GABAPENTIN SCH MG: 300 CAPSULE ORAL at 20:18

## 2022-06-03 RX ADMIN — ANTACID TABLETS PRN MG: 500 TABLET, CHEWABLE ORAL at 11:15

## 2022-06-03 RX ADMIN — ANTACID TABLETS PRN MG: 500 TABLET, CHEWABLE ORAL at 14:15

## 2022-06-03 RX ADMIN — MORPHINE SULFATE PRN MG: 2 INJECTION, SOLUTION INTRAMUSCULAR; INTRAVENOUS at 11:15

## 2022-06-03 RX ADMIN — ANTACID TABLETS PRN MG: 500 TABLET, CHEWABLE ORAL at 09:31

## 2022-06-03 RX ADMIN — MORPHINE SULFATE PRN MG: 2 INJECTION, SOLUTION INTRAMUSCULAR; INTRAVENOUS at 04:54

## 2022-06-03 RX ADMIN — SODIUM CHLORIDE SCH MLS: 0.9 INJECTION, SOLUTION INTRAVENOUS at 21:13

## 2022-06-03 RX ADMIN — POTASSIUM & SODIUM PHOSPHATES POWDER PACK 280-160-250 MG SCH PKT: 280-160-250 PACK at 09:31

## 2022-06-03 RX ADMIN — AMLODIPINE BESYLATE SCH: 5 TABLET ORAL at 09:00

## 2022-06-03 RX ADMIN — SODIUM CHLORIDE SCH MLS: 0.9 INJECTION, SOLUTION INTRAVENOUS at 11:15

## 2022-06-03 RX ADMIN — SODIUM CHLORIDE SCH MLS: 0.9 INJECTION, SOLUTION INTRAVENOUS at 01:55

## 2022-06-03 RX ADMIN — TRAMADOL HYDROCHLORIDE PRN MG: 50 TABLET, COATED ORAL at 20:17

## 2022-06-03 RX ADMIN — Medication PRN MG: at 01:55

## 2022-06-03 RX ADMIN — AMLODIPINE BESYLATE SCH MG: 5 TABLET ORAL at 09:15

## 2022-06-03 RX ADMIN — Medication SCH: at 20:18

## 2022-06-03 RX ADMIN — ANTACID TABLETS PRN MG: 500 TABLET, CHEWABLE ORAL at 20:17

## 2022-06-03 RX ADMIN — CYCLOBENZAPRINE HYDROCHLORIDE PRN MG: 10 TABLET, FILM COATED ORAL at 21:12

## 2022-06-03 RX ADMIN — ANTACID TABLETS PRN MG: 500 TABLET, CHEWABLE ORAL at 15:53

## 2022-06-03 RX ADMIN — POTASSIUM & SODIUM PHOSPHATES POWDER PACK 280-160-250 MG SCH PKT: 280-160-250 PACK at 06:04

## 2022-06-03 RX ADMIN — ENOXAPARIN SODIUM SCH MG: 40 INJECTION SUBCUTANEOUS at 09:16

## 2022-06-03 RX ADMIN — HYDROCODONE BITARTRATE AND ACETAMINOPHEN PRN TAB: 5; 325 TABLET ORAL at 01:56

## 2022-06-03 NOTE — P.PN
Subjective


Date of Service: 06/03/22


Chief Complaint: Backpain


Patient states he feels much better.  States his appetite has improved.  He also

reports his pain is better


He stated his muscle spasms have resolved.


Sodium level improving slowly.








Physical Examination





- Vital Signs


Temperature: 97.8 F


Blood Pressure: 158/86


Pulse: 80


Respirations: 16


Pulse Ox (%): 99





- Physical Exam


General: Alert, In no apparent distress, Oriented x3


HEENT: Mucous membr. moist/pink


Respiratory: Clear to auscultation bilaterally, Normal air movement


Cardiovascular: Regular rate/rhythm, Normal S1 S2


Gastrointestinal: Normal bowel sounds, Soft and benign, Non-distended, No 

tenderness


Musculoskeletal: No swelling


Integumentary: No rashes


Neurological: Normal strength at 5/5 x4 extr





Assessment And Plan





- Current Problems (Diagnosis)


(1) Hyponatremia


Current Visit: Yes   Status: Acute   





(2) Leukocytosis


Current Visit: Yes   Status: Acute   





(3) Chronic back pain


Current Visit: Yes   Status: Acute   





(4) Hypertension


Current Visit: Yes   Status: Acute   





- Plan


The etiology of hyponatremia unknown.  I suspect SIADH.


Nephrology input appreciated.


Currently normokalemic


Continue IV normal saline hydration.


Monitor BMP every 6 hours to follow sodium level.


We will also restrict free water.


Nephrology is following


UA: No evidence of UTI. Blood cultures: No growth to date.


Supportive measures with muscle relaxant and pain meds.


Etiology of leukocytosis unknown.  No evidence of infection


Monitor CBC to follow leukocytosis.

## 2022-06-03 NOTE — P.PN
Subjective


Date of Service: 06/03/22


Chief Complaint: Backpain


Subjective: No new changes





Physical Examination





- Vital Signs


Temperature: 98.2 F


Blood Pressure: 164/79


Pulse: 77


Respirations: 14


Pulse Ox (%): 98





- Physical Exam


General: In no apparent distress


HEENT: Atraumatic, Normocephalic


Neck: Supple, JVD not distended


Respiratory: Clear to auscultation bilaterally


Cardiovascular: No rubs, No murmurs


Gastrointestinal: Non-distended, No guarding


Musculoskeletal: No clubbing


Integumentary: No warmth


Neurological: Normal speech, Normal tone


Lymphatics: No axilla or inguinal lymphadenopathy


Urinary: Other (no bladder distention)


External genitalia: Deferred


Rectal: Deferred





Assessment And Plan





- Plan





# Hyponatremia 2/2 high ADH state from back pain + nausea +/- low solute intake


Serum Na 123 on adm, improved to 129


Urine chem c/w high ADH state


BNP elevated.  Cont NS gtt for now.


He is a vegetarian.  Advised on adeq po solid food intake TID.





# Hypokalemia


Improved


Monitor/replete prn





# HypoMg


Improved


Monitor/replete prn





# HypoPO4


Phos repletion po today





# Chronic back pain


Per primary team





# Htn


Cont current BP med regimen


Pain control

## 2022-06-04 LAB
BUN BLD-MCNC: 4 MG/DL (ref 7–18)
GLUCOSE SERPLBLD-MCNC: 82 MG/DL (ref 74–106)
POTASSIUM SERPL-SCNC: 2.4 MMOL/L (ref 3.5–5.1)
POTASSIUM UR-SCNC: 17 MMOL/L (ref 20–40)
SODIUM UR-SCNC: 114 MMOL/L (ref 27–287)

## 2022-06-04 RX ADMIN — GABAPENTIN SCH MG: 300 CAPSULE ORAL at 08:21

## 2022-06-04 RX ADMIN — GABAPENTIN SCH MG: 300 CAPSULE ORAL at 20:09

## 2022-06-04 RX ADMIN — POTASSIUM CHLORIDE SCH MLS: 200 INJECTION, SOLUTION INTRAVENOUS at 06:30

## 2022-06-04 RX ADMIN — ACETAMINOPHEN AND CODEINE PHOSPHATE PRN TAB: 300; 30 TABLET ORAL at 21:07

## 2022-06-04 RX ADMIN — POTASSIUM & SODIUM PHOSPHATES POWDER PACK 280-160-250 MG SCH PKT: 280-160-250 PACK at 09:26

## 2022-06-04 RX ADMIN — ANTACID TABLETS PRN MG: 500 TABLET, CHEWABLE ORAL at 04:44

## 2022-06-04 RX ADMIN — POTASSIUM CHLORIDE SCH MLS: 200 INJECTION, SOLUTION INTRAVENOUS at 09:28

## 2022-06-04 RX ADMIN — TRAMADOL HYDROCHLORIDE PRN MG: 50 TABLET, COATED ORAL at 04:36

## 2022-06-04 RX ADMIN — SODIUM CHLORIDE SCH MLS: 0.9 INJECTION, SOLUTION INTRAVENOUS at 13:10

## 2022-06-04 RX ADMIN — TRAMADOL HYDROCHLORIDE PRN MG: 50 TABLET, COATED ORAL at 00:16

## 2022-06-04 RX ADMIN — TRAMADOL HYDROCHLORIDE PRN MG: 50 TABLET, COATED ORAL at 08:22

## 2022-06-04 RX ADMIN — Medication SCH ML: at 20:09

## 2022-06-04 RX ADMIN — AMLODIPINE BESYLATE SCH MG: 5 TABLET ORAL at 08:22

## 2022-06-04 RX ADMIN — ANTACID TABLETS PRN MG: 500 TABLET, CHEWABLE ORAL at 08:21

## 2022-06-04 RX ADMIN — Medication PRN MG: at 00:16

## 2022-06-04 RX ADMIN — POTASSIUM & SODIUM PHOSPHATES POWDER PACK 280-160-250 MG SCH PKT: 280-160-250 PACK at 08:21

## 2022-06-04 RX ADMIN — POTASSIUM & SODIUM PHOSPHATES POWDER PACK 280-160-250 MG SCH PKT: 280-160-250 PACK at 06:30

## 2022-06-04 RX ADMIN — TRAMADOL HYDROCHLORIDE SCH MG: 50 TABLET, COATED ORAL at 20:09

## 2022-06-04 RX ADMIN — POTASSIUM CHLORIDE SCH MLS: 200 INJECTION, SOLUTION INTRAVENOUS at 11:51

## 2022-06-04 RX ADMIN — TRAMADOL HYDROCHLORIDE SCH MG: 50 TABLET, COATED ORAL at 15:07

## 2022-06-04 RX ADMIN — ANTACID TABLETS PRN MG: 500 TABLET, CHEWABLE ORAL at 12:27

## 2022-06-04 RX ADMIN — ANTACID TABLETS PRN MG: 500 TABLET, CHEWABLE ORAL at 20:09

## 2022-06-04 RX ADMIN — SODIUM CHLORIDE SCH: 0.9 INJECTION, SOLUTION INTRAVENOUS at 00:35

## 2022-06-04 RX ADMIN — MAGNESIUM OXIDE TAB 400 MG (241.3 MG ELEMENTAL MG) SCH MG: 400 (241.3 MG) TAB at 20:08

## 2022-06-04 RX ADMIN — Medication SCH: at 08:22

## 2022-06-04 RX ADMIN — SODIUM CHLORIDE SCH MLS: 0.9 INJECTION, SOLUTION INTRAVENOUS at 04:36

## 2022-06-04 RX ADMIN — TRAMADOL HYDROCHLORIDE PRN MG: 50 TABLET, COATED ORAL at 12:24

## 2022-06-04 RX ADMIN — ENOXAPARIN SODIUM SCH MG: 40 INJECTION SUBCUTANEOUS at 08:20

## 2022-06-04 RX ADMIN — ANTACID TABLETS PRN MG: 500 TABLET, CHEWABLE ORAL at 16:21

## 2022-06-04 RX ADMIN — GABAPENTIN SCH MG: 300 CAPSULE ORAL at 13:08

## 2022-06-04 NOTE — P.PN
Subjective


Date of Service: 06/04/22


Chief Complaint: Backpain


Patient of uncontrolled generalized bodily pain


Hyponatremia resolved but patient has had a multiple electrolyte abnormalities 

including hypokalemia, hypocalcemia and metabolic acidosis.


He was abusing Tums for GERD, about 8 to 10 tablets/day.





Physical Examination





- Vital Signs


Temperature: 98.5 F


Blood Pressure: 157/82


Pulse: 77


Respirations: 20


Pulse Ox (%): 98





- Physical Exam


General: Alert, In no apparent distress, Oriented x3


HEENT: Mucous membr. moist/pink


Neck: JVD not distended


Respiratory: Clear to auscultation bilaterally, Normal air movement


Cardiovascular: No edema, Regular rate/rhythm, Normal S1 S2


Gastrointestinal: Soft and benign, Non-distended, No tenderness


Musculoskeletal: No swelling


Neurological: Normal speech, Normal strength at 5/5 x4 extr, Cranial nerves 3-12

intact





Assessment And Plan





- Current Problems (Diagnosis)


(1) Hyponatremia


Current Visit: Yes   Status: Acute   





(2) Leukocytosis


Current Visit: Yes   Status: Acute   





(3) Chronic back pain


Current Visit: Yes   Status: Acute   





(4) Hypertension


Current Visit: Yes   Status: Acute   





(5) Hypocalcemia


Current Visit: Yes   Status: Acute   





(6) Metabolic acidosis


Current Visit: Yes   Status: Acute   





- Plan


The etiology of hyponatremia unknown.  Probably SIADH.


Nephrology input appreciated.  Patient with multiple electrolyte abnormalities 

today.


Stopped IV fluid.  Sodium level increased by 10 mEq within 18 hours.


Continue to monitor BMP.


Replete calcium and potassium.


Check magnesium level.


Nephrology is following


UA: No evidence of UTI. Blood cultures: No growth to date.


Supportive measures with muscle relaxant and pain meds.  Patient prefers 

tramadol and Tylenol 3.


Etiology of leukocytosis unknown.  No evidence of infection


Check CBC in am.


Patient with reduced functional capacity due to pain.


Continue PT.

## 2022-06-04 NOTE — P.PN
Subjective


Date of Service: 06/04/22


Chief Complaint: Backpain





Subjective 


Pt admitted with hyponatremia , low phosphorus and Mg 


sodium improved slowly on IVF, but pt developed  hypoklemia 








Today 


Na improved to 138 


labs today also K 2.4, Phos 2.1 


will stop IVF 


will add ensure 


pt denied diarrhea 


will check uric acid 


will satrtr on daily MG Oxide 





physical exam 


General: Awake, NAD, obese  


HEENT: Atraumatic, Normocephalic


Neck: Supple, no elevated JVD 


Respiratory: Other CTAB. No rales or wheezes 


Cardiovascular: No rubs, No murmurs


Gastrointestinal: Soft and benign, Non-distended


Musculoskeletal: No clubbing


Integumentary: No warmth


Neurological: Normal tone, Sensation intact


Lymphatics: No axilla or inguinal lymphadenopathy


Urinary: Other (no bladder distention)





A/P 


# Hyponatremia 2/2 high ADH state from back pain + nausea +/- low solute intake


Na improved today to 138 


will stop IVF 


will check uric acid 


TSH 0.3 








# Hypokalemia , hypomagnesemia and hypophospahtemia 


possibly due to hypomagensemia 


pt with obesity , so unlikely due to poor intake 


TSH 0.3 


pt not on PPI 


will start daily Mg replacement 


will check Uric acid 








# Chronic back pain


Per primary team





# Htn


Cont current BP med regimen


Pain control








Total time spent 65 minutes including documentation, reviewing labs , placing 

orders and discussing plan of care with medical staff and pt








Physical Examination





- Vital Signs


Temperature: 98.5 F


Blood Pressure: 157/82


Pulse: 77


Respirations: 20


Pulse Ox (%): 98

## 2022-06-05 VITALS — TEMPERATURE: 98.1 F | SYSTOLIC BLOOD PRESSURE: 155 MMHG | DIASTOLIC BLOOD PRESSURE: 75 MMHG

## 2022-06-05 VITALS — OXYGEN SATURATION: 98 %

## 2022-06-05 LAB
ALBUMIN SERPL BCP-MCNC: 2.8 G/DL (ref 3.4–5)
BUN BLD-MCNC: 6 MG/DL (ref 7–18)
GLUCOSE SERPLBLD-MCNC: 134 MG/DL (ref 74–106)
HCT VFR BLD CALC: 32.3 % (ref 39.6–49)
LYMPHOCYTES # SPEC AUTO: 1.5 K/UL (ref 0.7–4.9)
MAGNESIUM SERPL-MCNC: 1.6 MG/DL (ref 1.8–2.4)
PMV BLD: 8 FL (ref 7.6–11.3)
POTASSIUM SERPL-SCNC: 3.9 MMOL/L (ref 3.5–5.1)
RBC # BLD: 3.69 M/UL (ref 4.33–5.43)

## 2022-06-05 RX ADMIN — ENOXAPARIN SODIUM SCH MG: 40 INJECTION SUBCUTANEOUS at 08:33

## 2022-06-05 RX ADMIN — GABAPENTIN SCH MG: 300 CAPSULE ORAL at 08:33

## 2022-06-05 RX ADMIN — ANTACID TABLETS PRN MG: 500 TABLET, CHEWABLE ORAL at 05:50

## 2022-06-05 RX ADMIN — TRAMADOL HYDROCHLORIDE SCH: 50 TABLET, COATED ORAL at 03:00

## 2022-06-05 RX ADMIN — Medication SCH ML: at 08:33

## 2022-06-05 RX ADMIN — ACETAMINOPHEN AND CODEINE PHOSPHATE PRN TAB: 300; 30 TABLET ORAL at 03:25

## 2022-06-05 RX ADMIN — MAGNESIUM OXIDE TAB 400 MG (241.3 MG ELEMENTAL MG) SCH MG: 400 (241.3 MG) TAB at 08:33

## 2022-06-05 RX ADMIN — GABAPENTIN SCH MG: 300 CAPSULE ORAL at 13:22

## 2022-06-05 RX ADMIN — TRAMADOL HYDROCHLORIDE SCH MG: 50 TABLET, COATED ORAL at 01:15

## 2022-06-05 RX ADMIN — AMLODIPINE BESYLATE SCH MG: 5 TABLET ORAL at 08:36

## 2022-06-05 RX ADMIN — ANTACID TABLETS PRN MG: 500 TABLET, CHEWABLE ORAL at 10:05

## 2022-06-05 RX ADMIN — ANTACID TABLETS PRN MG: 500 TABLET, CHEWABLE ORAL at 01:19

## 2022-06-05 RX ADMIN — TRAMADOL HYDROCHLORIDE SCH MG: 50 TABLET, COATED ORAL at 05:50

## 2022-06-05 RX ADMIN — TRAMADOL HYDROCHLORIDE SCH MG: 50 TABLET, COATED ORAL at 10:04

## 2022-06-05 NOTE — P.DS
Admission Date: 06/01/22


Discharge Date: 06/05/22


Disposition: [facility]/HOME HEALTH CARE


Discharge Condition: FAIR


Reason for Admission: Backpain





- Problems


(1) Hyponatremia


Current Visit: Yes   Status: Acute   





(2) Leukocytosis


Current Visit: Yes   Status: Acute   





(3) Chronic back pain


Current Visit: Yes   Status: Acute   





(4) Hypertension


Current Visit: Yes   Status: Acute   





(5) Hypocalcemia


Current Visit: Yes   Status: Acute   





(6) Metabolic acidosis


Current Visit: Yes   Status: Acute   


Brief History of Present Illness: 


67-year-old gentleman with a history of chronic back pain and hypertension 

presented to the emergency department with a complaint of back pain which has 

gotten progressively worse over the past 1 week.  He is on a chronic hydrocodone

therapy for the back pain, hydrocodone was started about 1 month ago.  Before 

that his pain was being managed with tramadol.  Patient also complaining of 

spasms in his arms and legs and difficulty with mobility.  He denied any fever 

or chills.  He denied any nausea vomiting or diarrhea.  He denied any cough.  

Blood work done in the emergency department demonstrated hyponatremia and 

leukocytosis.  UA negative for UTI, COVID-19 test negative. Patient with 

uncontrolled pain.  He reports he ran out of his pain medication about 1 week 

ago.  He just established care with a pain management physician in [place].

Patient was hospitalized for further management.





Hospital Course: 


Patient with psychogenic polydipsia.  Hyponatremia likely secondary to 

polydipsia.


Patient started on IV normal saline and nephrology consulted.


Serum sodium level improved but patient developed other electrolyte 

abnormalities including hypokalemia, metabolic acidosis and hypocalcemia.  

Nephrology attributes to multiple electrolyte imbalance to hypomagnesemia versus

IV normal saline.  Electrolyte abnormalities were successfully repleted.  

Patient noted to have psychogenic dyspnea, states that he drinks about 7-8 

pitchers of water in a day.


IV fluid discontinued.  Patient placed on a fluid restriction.  Nephrology also 

prescribed sodium tablets.


UA: No evidence of UTI. Blood cultures: No growth to date.


I suspect his muscle spasms were due to electrolyte abnormalities.  Muscle 

spasms were managed with Flexeril.  He was also on tramadol for pain control.  

Patient stated he prefers tramadol to Drury for outpatient pain control.  He has

an appointment to establish care with a pain management physician tomorrow.


Etiology of leukocytosis unknown.  No evidence of infection.  Leukocytosis 

resolved.


Patient at baseline has decreased functional capacity.  He was seen and 

evaluated by PT who recommended home with home health for PT


Vitals have been stable, patient has clinically improved and deemed stable for 

discharge.


Vital Signs/Physical Exam: 














Temp Pulse Resp BP Pulse Ox


 


 97.8 F   86   18   138/59 L  98 


 


 06/05/22 09:55  06/05/22 09:55  06/05/22 10:04  06/05/22 09:55  06/05/22 10:04








General: Alert, In no apparent distress, Oriented x3


HEENT: Mucous membr. moist/pink


Neck: JVD not distended


Respiratory: Clear to auscultation bilaterally, Normal air movement


Cardiovascular: No edema, Regular rate/rhythm


Capillary refill: <2 Seconds


Gastrointestinal: Normal bowel sounds, Soft and benign, Non-distended


Musculoskeletal: No swelling, No tenderness


Integumentary: No rashes, No erythema


Neurological: Normal strength at 5/5 x4 extr


Laboratory Data at Discharge: 














WBC  9.6 K/uL (4.3-10.9)  D 06/05/22  03:04    


 


Hgb  11.6 g/dL (13.6-17.9)  L  06/05/22  03:04    


 


Hct  32.3 % (39.6-49.0)  L  06/05/22  03:04    


 


Plt Count  210 K/uL (152-406)   06/05/22  03:04    


 


Sodium  128 mmol/L (136-145)  L  06/05/22  03:04    


 


Potassium  3.9 mmol/L (3.5-5.1)   06/05/22  03:04    


 


BUN  6 mg/dL (7-18)  L  06/05/22  03:04    


 


Creatinine  0.49 mg/dL (0.55-1.3)  L  06/05/22  03:04    


 


Glucose  134 mg/dL ()  H  06/05/22  03:04    


 


Uric Acid  3.4 mg/dL (3.5-7.2)  L  06/05/22  03:04    


 


Phosphorus  3.5 mg/dL (2.5-4.9)   06/05/22  03:04    


 


Phosphorus  3.5 mg/dL (2.5-4.9)  D 06/05/22  03:04    


 


Magnesium  1.6 mg/dL (1.8-2.4)  L  06/05/22  03:04    








Home Medications: 








Citalopram [Celexa*] 40 mg PO DAILY 06/01/22 


Gabapentin 300 mg PO TID 06/01/22 


Hydrocodone Bit/Acetaminophen [Norco  Tablet] 1 each PO TID PRN 06/01/22 


Amlodipine [Norvasc*] 5 mg PO DAILY #30 tab 06/05/22 


Calcium Carbonate [Tums Regular*] 500 mg PO Q4H PRN  tab 06/05/22 


Magnesium Oxide [Mag 0X*] 400 mg PO BID #30 tab 06/05/22 


Sodium Chloride Tab [Sodium Chloride*] 1 gm PO BIDWM #60 tab 06/05/22 


traMADol HCL [Ultram*] 50 mg PO Q4H #24 tab 06/05/22 





New Medications: 


Magnesium Oxide [Mag 0X*] 400 mg PO BID #30 tab


Amlodipine [Norvasc*] 5 mg PO DAILY #30 tab


Sodium Chloride Tab [Sodium Chloride*] 1 gm PO BIDWM #60 tab


traMADol HCL [Ultram*] 50 mg PO Q4H #24 tab


Diet: Regular


Activity: Fall precautions


Followup: 


NONE,NONE [Primary Care Provider] - 1-2 Weeks


([facility] to schedule an appointment


)


Time spent managing pt's care (in minutes): 36

## 2022-06-05 NOTE — P.PN
Subjective


Date of Service: 06/05/22


Chief Complaint: Backpain





Subjective 


Pt admitted with hyponatremia , low phosphorus and Mg 


sodium improved slowly on IVF, but pt developed  hypoklemia 








Today 


no overnight events 


pain is better controlled 


NA down to 128 , pt stated he drinks 7-8 Pitchers of water daily


will restrict fluids to 2 liters daily 


will add salt tablets  














physical exam 


General: Awake, NAD, obese  


HEENT: Atraumatic, Normocephalic


Neck: Supple, no elevated JVD 


Respiratory: Other CTAB. No rales or wheezes 


Cardiovascular: No rubs, No murmurs


Gastrointestinal: Soft and benign, Non-distended


Musculoskeletal: No clubbing


Integumentary: No warmth


Neurological: Normal tone, Sensation intact


Lymphatics: No axilla or inguinal lymphadenopathy


Urinary: Other (no bladder distention)





A/P 


# Hyponatremia 2/2 high ADH state from back pain + nausea +/- low solute intake


NA down to 128 , pt stated he drinks 7-8 Pitchers of water daily


will restrict fluids to 2 liters daily 


will add salt tablets 


uric acid slightly low 


TSH 0.3 








# Hypokalemia , hypomagnesemia and hypophospahtemia 


possibly due to hypomagensemia 


pt with obesity , so unlikely due to poor intake 


TSH 0.3 


pt not on PPI 


daily PO Mg replacement 











# Chronic back pain


Per primary team





# Htn


Cont current BP med regimen


Pain control








Total time spent 45 minutes including documentation, reviewing labs , placing 

orders and discussing plan of care with medical staff and pt








Physical Examination





- Vital Signs


Temperature: 97.8 F


Blood Pressure: 138/59


Pulse: 86


Respirations: 18


Pulse Ox (%): 98

## 2022-06-09 LAB
1,25(OH)2D SERPL-MCNC: 37 PG/ML (ref 18–72)
1,25(OH)2D2 SERPL-MCNC: <8 PG/ML